# Patient Record
Sex: MALE | URBAN - NONMETROPOLITAN AREA
[De-identification: names, ages, dates, MRNs, and addresses within clinical notes are randomized per-mention and may not be internally consistent; named-entity substitution may affect disease eponyms.]

---

## 2022-10-05 ENCOUNTER — OFFICE VISIT (OUTPATIENT)
Dept: FAMILY MEDICINE CLINIC | Age: 27
End: 2022-10-05
Payer: MEDICAID

## 2022-10-05 VITALS
DIASTOLIC BLOOD PRESSURE: 60 MMHG | RESPIRATION RATE: 18 BRPM | HEART RATE: 78 BPM | OXYGEN SATURATION: 99 % | TEMPERATURE: 98 F | HEIGHT: 67 IN | BODY MASS INDEX: 22.29 KG/M2 | SYSTOLIC BLOOD PRESSURE: 109 MMHG | WEIGHT: 142 LBS

## 2022-10-05 DIAGNOSIS — R05.8 POST-VIRAL COUGH SYNDROME: Primary | ICD-10-CM

## 2022-10-05 DIAGNOSIS — Z11.3 ROUTINE SCREENING FOR STI (SEXUALLY TRANSMITTED INFECTION): ICD-10-CM

## 2022-10-05 DIAGNOSIS — Z86.39 HISTORY OF THYROID DISEASE: ICD-10-CM

## 2022-10-05 DIAGNOSIS — K21.9 GASTROESOPHAGEAL REFLUX DISEASE WITHOUT ESOPHAGITIS: ICD-10-CM

## 2022-10-05 DIAGNOSIS — Z00.00 HEALTHCARE MAINTENANCE: ICD-10-CM

## 2022-10-05 PROBLEM — K21.00 REFLUX ESOPHAGITIS: Status: ACTIVE | Noted: 2022-10-05

## 2022-10-05 PROCEDURE — 99204 OFFICE O/P NEW MOD 45 MIN: CPT | Performed by: STUDENT IN AN ORGANIZED HEALTH CARE EDUCATION/TRAINING PROGRAM

## 2022-10-05 NOTE — PROGRESS NOTES
Luis Joseph presents today for   Chief Complaint   Patient presents with    New Patient     New patient - Here to establish care, first PCP since pediatrician        Is someone accompanying this pt? mom    Is the patient using any DME equipment during [address]? no    Depression Screening:  3 most recent PHQ Screens 10/5/2022   Little interest or pleasure in doing things Not at all   Feeling down, depressed, irritable, or hopeless Not at all   Total Score PHQ 2 0       Learning Assessment:  No flowsheet data found. Health Maintenance reviewed and discussed and ordered per Provider. Health Maintenance Due   Topic Date Due    Hepatitis C Screening  Never done    Depression Screen  Never done    COVID-19 Vaccine (1) Never done    DTaP/Tdap/Td series (6 - Td or Tdap) 06/19/2019    Flu Vaccine (1) Never done   . Coordination of Care:  1. \"Have you been to the ER, urgent care clinic since your last visit? Hospitalized since your last visit? \" Yes [facility]    2. \"Have you seen or consulted any other health care providers outside of the [address] [place] [address] since your last visit? \" Yes [facility]      3. For patients aged 39-70: Has the patient had a colonoscopy? NA - based on age     If the patient is female:    4. For patients aged 41-77: Has the patient had a mammogram within the past 2 years? NA - based on age/sex    5. For patients aged 21-65: Has the patient had a pap smear?  NA - based on age/sex

## 2022-10-05 NOTE — PROGRESS NOTES
Annual Physical Exam    History of Present Illness    Chief Complaint   Patient presents with    New Patient     New patient - Here to establish care, first PCP since pediatrician        Leonie Turcios is a 27 y.o. male who presents today for annual check up/establishment of care. Histories have been reviewed and updated and are outlined per below. Patients acute concerns today include cough. Patient likely had viral infection in early September, he had flulike symptoms which have since resolved, however ever since he endorses a chronic dry cough. Does not bother him much, not associated with any shortness of breath, not productive. He denies any history of chronic cough. No history of asthma. He does endorse significant acid reflux symptoms, also has episodes of food regurgitation intermittently after eating. Patient's mother reports that he mostly always eats lying down or lies down right after he eats. Patient denies any nausea prior to vomiting. Vomit is just regurgitated undigested food. This happens fairly infrequently, but has been an issue. Patient denies any abdominal pain. He does endorse some belching and sometimes an acid taste in his mouth. Patient sees a dentist regularly. No new changes with hearing or vision. 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 No pertinent surgical history. Current Medications  No current outpatient medications on file. No current facility-administered medications for this visit.        Allergies/Drug Reactions  No Known Allergies     Family History  Family History   Problem Relation Age of Onset    Elevated Lipids Mother     Lung Disease Father         sarcoidosis        Social History  Social History     Socioeconomic History    Marital status: SINGLE   Tobacco Use    Smoking status: Never    Smokeless tobacco: Never   Vaping Use    Vaping Use: Never used   Substance and Sexual Activity    Alcohol use: Not Currently    Drug use: Not Currently     Social Determinants of Health     Financial Resource Strain: Low Risk     Difficulty of Paying Living Expenses: Not very hard   Food Insecurity: Food Insecurity Present    Worried About [address] in the Last Year: Never true    Ran Out of Food in the Last Year: Sometimes true   Transportation Needs: No Transportation Needs    Lack of Transportation (Medical): No    Lack of Transportation (Non-Medical): No   Housing Stability: Unknown    Unable to Pay for Housing in the Last Year: Patient refused    Unstable Housing in the Last Year: No        OB History   No obstetric history on file. Health Maintenance   Topic Date Due    Hepatitis C Screening  Never done    DTaP/Tdap/Td series (6 - Td or Tdap) 06/19/2019    Flu Vaccine (1) Never done    COVID-19 Vaccine (1) 12/01/2023 (Originally 6/1/1998)    Depression Screen  10/05/2023    Pneumococcal 0-64 years  Aged [employer] History   Administered Date(s) Administered    DTaP 02/16/1998, 04/14/1998, 10/28/1998, 08/28/2003    DTaP-Hib 08/26/2002    Hep A Vaccine 2 Dose Schedule (Ped/Adol) 07/26/2011, 10/15/2013    Hep B, Adol/Ped 1997, 08/26/2002    Hib (PRP-T) 04/14/1998, 10/28/1998    Hib-Hep B 02/16/1998    IPV 02/16/1998, 04/14/1998, 08/28/2003    MMR 08/26/2002, 08/28/2003    Meningococcal (MCV4P) Vaccine 06/19/2009, 12/28/2015    Tdap 06/19/2009       Patient Care Team:  Danisha Cruz MD as PCP - General (Family Medicine)  Ernesto Leon MD (Pediatric Medicine)    Review of Systems  CONSTITUTIONAL: Denies weight loss, fevers and chills  HEENT: Denies changes in vision and hearing  RESP: Denies SOB. + dry cough  CV: Denies palpitations, chest pain  GI: Denies abdominal pain, nausea,  diarrhea. + occasional vomiting.    : Denies dysuria and urinary frequency   MSK: Denies myalgia and joint pain  SKIN: Denies rash and pruritus  NEURO: Denies headache and syncope  PSYCH: Denies recent changes in mood. Denies anxiety and depression. Physical Exam  Vital signs:   Vitals:    10/05/22 0957   BP: 109/60   Pulse: 78   Resp: 18   Temp: 98 °F (36.7 °C)   TempSrc: Temporal   SpO2: 99%   Weight: 142 lb (64.4 kg)   Height: 5' 7\" (1.702 m)       General: alert, oriented, not in distress  Head: scalp normal, atraumatic  Eyes: pupils are equal and reactive, full and intact EOM's  Ears: cerumen impaction bilaterally  Nose: normal turbinates, no congestion or discharge  Lips/Mouth: moist lips and buccal mucosa, non-enlarged tonsils, some erythema in the oropharynx   Neck: supple,  no lymphadenopathy, normal thyroid  Chest/Lungs: clear breath sounds, no wheezing or crackles  Heart: normal rate, regular rhythm, no murmur  Abdomen: soft, non-distended, non-tender, normal bowel sounds, no organomegaly, no masses  MSK: no focal deformities, no edema  Neuro: no focal deficits  Skin: no active skin lesions  Psych: Cooperative. Appropriate mood and affect      Assessment/Plan:    1. Routine screening for STI (sexually transmitted infection)  - CHLAMYDIA/NEISSERIA AMPLIFICATION  - HIV 1/2 AG/AB, 4TH GENERATION,W RFLX CONFIRM  - RPR    2. History of thyroid disease  We will recheck TSH, will monitor.  - TSH 3RD GENERATION    3. Healthcare maintenance  - HEPATITIS C AB  Recommend patient has Tdap shot at follow-up. 4. Post-viral cough syndrome  Cough is likely postviral.  Acid reflux may also be contributing as well. See #5. Reassurance that dry cough can last several weeks to months after a viral infection. Follow-up if worsening or not improving. 5. Gastroesophageal reflux disease without esophagitis  Discussed lifestyle modification in detail.   Recommend eating in the upright position and remaining upright for at least 30 minutes after the meal.  May trial OTC Prilosec if persistent symptoms despite lifestyle modifications. Follow-up if not improving. Follow-up and Dispositions    Return in about 1 year (around 10/5/2023). Anticipatory guidance based on USPSTF recommendations. Nutrition, alcohol use and smoking prevention counseling. Exercise recommendations are at least 150 minutes of moderate intensity exercise weekly. Recommend daily vitamin D supplement of 800-1000 IU's daily. Recommend sunscreen use, safe sun practices to reduce skin cancer risk. Recommend brushing teeth twice a day, regular dental visits. Encourage seat belt use at all times for everyone. Wellness handout given in after visit summary    Screenings advised/offered for adults: cholesterol, diabetes, HIV/STI. Colon cancer screening for everyone >50. Cervical cancer screening for women. Breast cancer screening for women starting at age 36. Prostate cancer screening for men starting at age 48 based on shared decision making. Recommend yearly flu shot for everyone. Tdap every 10 years. Shingles vaccine for everyone >50. Pneumonia vaccine once for everyone 65+, as well as for people with certain medical conditions such as smokers, chronic lung disease, chronic heart disease, immunocompromised. I have discussed the diagnosis with the patient and the intended plan as seen in the above orders. The patient has received an after-visit summary and questions were answered concerning future plans. I have discussed medication side effects and warnings with the patient as well. I have reviewed the plan of care with the patient, accepted their input and they are in agreement with the treatment goals. Previous lab and imaging results were reviewed by me. On this date 10/05/22 I have spent 25 minutes reviewing previous notes, test results and face to face with the patient for interview/exam, discussing working diagnosis and treatment plan as well as documenting on the day of the visit.        Kalina Vigil, MD  October 5, 2022

## 2023-05-17 ENCOUNTER — TELEPHONE (OUTPATIENT)
Facility: CLINIC | Age: 28
End: 2023-05-17

## 2023-05-17 DIAGNOSIS — Z86.39 PERSONAL HISTORY OF OTHER ENDOCRINE, NUTRITIONAL AND METABOLIC DISEASE: ICD-10-CM

## 2023-05-17 DIAGNOSIS — Z11.3 ENCOUNTER FOR SCREENING FOR INFECTIONS WITH A PREDOMINANTLY SEXUAL MODE OF TRANSMISSION: Primary | ICD-10-CM

## 2023-05-17 DIAGNOSIS — Z00.00 ENCOUNTER FOR GENERAL ADULT MEDICAL EXAMINATION WITHOUT ABNORMAL FINDINGS: ICD-10-CM

## 2023-05-17 NOTE — TELEPHONE ENCOUNTER
Patient came in stating he is joining the [employer] and he needs the paper work for his thyroid blood work results back to show the  that he is ok.     The best number to reach the patient at is [phone]

## 2023-05-23 ENCOUNTER — HOSPITAL ENCOUNTER (OUTPATIENT)
Age: 28
Discharge: HOME | End: 2023-05-26
Payer: MEDICAID

## 2023-05-23 DIAGNOSIS — Z00.00 ENCOUNTER FOR GENERAL ADULT MEDICAL EXAMINATION WITHOUT ABNORMAL FINDINGS: ICD-10-CM

## 2023-05-23 DIAGNOSIS — Z11.3 ENCOUNTER FOR SCREENING FOR INFECTIONS WITH A PREDOMINANTLY SEXUAL MODE OF TRANSMISSION: ICD-10-CM

## 2023-05-23 DIAGNOSIS — Z86.39 PERSONAL HISTORY OF OTHER ENDOCRINE, NUTRITIONAL AND METABOLIC DISEASE: ICD-10-CM

## 2023-05-23 LAB — TSH SERPL DL<=0.05 MIU/L-ACNC: 1.6 UIU/ML (ref 0.36–3.74)

## 2023-05-23 PROCEDURE — 84443 ASSAY THYROID STIM HORMONE: CPT

## 2023-05-23 PROCEDURE — 86592 SYPHILIS TEST NON-TREP QUAL: CPT

## 2023-05-23 PROCEDURE — 36415 COLL VENOUS BLD VENIPUNCTURE: CPT

## 2023-05-23 PROCEDURE — 86803 HEPATITIS C AB TEST: CPT

## 2023-05-23 PROCEDURE — 87491 CHLMYD TRACH DNA AMP PROBE: CPT

## 2023-05-23 PROCEDURE — 87591 N.GONORRHOEAE DNA AMP PROB: CPT

## 2023-05-23 PROCEDURE — 87389 HIV-1 AG W/HIV-1&-2 AB AG IA: CPT

## 2023-05-24 LAB — RPR SER QL: NON REACTIVE

## 2023-05-25 LAB
HCV AB SER IA-ACNC: 0.08 INDEX
HCV AB SERPL QL IA: NEGATIVE
HIV 1+2 AB+HIV1 P24 AG SERPL QL IA: NONREACTIVE
HIV 1/2 RESULT COMMENT: NORMAL
Lab: NORMAL